# Patient Record
Sex: FEMALE | Race: WHITE | NOT HISPANIC OR LATINO | URBAN - METROPOLITAN AREA
[De-identification: names, ages, dates, MRNs, and addresses within clinical notes are randomized per-mention and may not be internally consistent; named-entity substitution may affect disease eponyms.]

---

## 2022-11-09 PROBLEM — O26.899 RH NEGATIVE STATE IN ANTEPARTUM PERIOD: Status: ACTIVE | Noted: 2022-11-09

## 2022-11-09 PROBLEM — Z67.91 RH NEGATIVE STATE IN ANTEPARTUM PERIOD: Status: ACTIVE | Noted: 2022-11-09

## 2022-12-19 PROBLEM — Z34.92 NORMAL PREGNANCY IN SECOND TRIMESTER: Status: ACTIVE | Noted: 2022-12-19

## 2022-12-19 PROBLEM — F32.A DEPRESSION AFFECTING PREGNANCY: Status: ACTIVE | Noted: 2022-12-19

## 2022-12-19 PROBLEM — O99.340 DEPRESSION AFFECTING PREGNANCY: Status: ACTIVE | Noted: 2022-12-19

## 2022-12-29 PROBLEM — R87.610 ATYPICAL SQUAMOUS CELL CHANGES OF UNDETERMINED SIGNIFICANCE (ASCUS) ON CERVICAL CYTOLOGY WITH POSITIVE HIGH RISK HUMAN PAPILLOMA VIRUS (HPV): Status: ACTIVE | Noted: 2022-12-29

## 2022-12-29 PROBLEM — R87.810 ATYPICAL SQUAMOUS CELL CHANGES OF UNDETERMINED SIGNIFICANCE (ASCUS) ON CERVICAL CYTOLOGY WITH POSITIVE HIGH RISK HUMAN PAPILLOMA VIRUS (HPV): Status: ACTIVE | Noted: 2022-12-29

## 2023-02-09 PROBLEM — Z86.19 HISTORY OF COLD SORES: Status: ACTIVE | Noted: 2023-02-09

## 2023-02-21 ENCOUNTER — PATIENT MESSAGE (OUTPATIENT)
Dept: ADMINISTRATIVE | Facility: OTHER | Age: 23
End: 2023-02-21

## 2023-03-02 PROBLEM — O26.842 UTERINE SIZE-DATE DISCREPANCY IN SECOND TRIMESTER: Status: ACTIVE | Noted: 2023-03-02

## 2023-03-14 ENCOUNTER — PATIENT MESSAGE (OUTPATIENT)
Dept: ADMINISTRATIVE | Facility: OTHER | Age: 23
End: 2023-03-14

## 2023-03-15 PROBLEM — Z34.03 SUPERVISION OF NORMAL FIRST PREGNANCY IN THIRD TRIMESTER: Status: ACTIVE | Noted: 2022-12-19

## 2023-03-15 PROBLEM — J06.9 VIRAL URI WITH COUGH: Status: ACTIVE | Noted: 2023-03-15

## 2023-03-27 PROBLEM — O26.842 UTERINE SIZE-DATE DISCREPANCY IN SECOND TRIMESTER: Status: RESOLVED | Noted: 2023-03-02 | Resolved: 2023-03-27

## 2023-05-04 PROBLEM — R51.9 PREGNANCY HEADACHE IN THIRD TRIMESTER: Status: ACTIVE | Noted: 2023-05-04

## 2023-05-04 PROBLEM — O36.8130 DECREASED FETAL MOVEMENT AFFECTING MANAGEMENT OF PREGNANCY IN THIRD TRIMESTER: Status: ACTIVE | Noted: 2023-05-04

## 2023-05-04 PROBLEM — O26.893 PREGNANCY HEADACHE IN THIRD TRIMESTER: Status: ACTIVE | Noted: 2023-05-04

## 2023-05-18 PROBLEM — O36.8130 DECREASED FETAL MOVEMENT AFFECTING MANAGEMENT OF PREGNANCY IN THIRD TRIMESTER: Status: RESOLVED | Noted: 2023-05-04 | Resolved: 2023-05-18

## 2023-05-22 PROBLEM — O16.3 ELEVATED BLOOD PRESSURE AFFECTING PREGNANCY IN THIRD TRIMESTER, ANTEPARTUM: Status: ACTIVE | Noted: 2023-05-22

## 2023-08-16 PROBLEM — Z34.03 SUPERVISION OF NORMAL FIRST PREGNANCY IN THIRD TRIMESTER: Status: RESOLVED | Noted: 2022-12-19 | Resolved: 2023-08-16

## 2023-08-16 PROBLEM — O16.3 ELEVATED BLOOD PRESSURE AFFECTING PREGNANCY IN THIRD TRIMESTER, ANTEPARTUM: Status: RESOLVED | Noted: 2023-05-22 | Resolved: 2023-08-16

## 2023-08-16 PROBLEM — O26.899 RH NEGATIVE STATE IN ANTEPARTUM PERIOD: Status: RESOLVED | Noted: 2022-11-09 | Resolved: 2023-08-16

## 2023-08-16 PROBLEM — J06.9 VIRAL URI WITH COUGH: Status: RESOLVED | Noted: 2023-03-15 | Resolved: 2023-08-16

## 2023-08-16 PROBLEM — Z67.91 RH NEGATIVE STATE IN ANTEPARTUM PERIOD: Status: RESOLVED | Noted: 2022-11-09 | Resolved: 2023-08-16

## 2023-08-16 PROBLEM — O36.8130 DECREASED FETUS MOVEMENTS AFFECTING MANAGEMENT OF MOTHER IN THIRD TRIMESTER: Status: RESOLVED | Noted: 2023-05-04 | Resolved: 2023-08-16

## 2023-08-16 PROBLEM — Z30.430 ENCOUNTER FOR IUD INSERTION: Status: ACTIVE | Noted: 2023-08-16

## 2023-08-29 ENCOUNTER — NURSE TRIAGE (OUTPATIENT)
Dept: ADMINISTRATIVE | Facility: CLINIC | Age: 23
End: 2023-08-29

## 2023-08-29 NOTE — TELEPHONE ENCOUNTER
Sore throat, headache, congested, hurts to talk. He daughter started coughing these passed two days as well. More difficulty to breathe with activity. Pt stated when she gets up she is not able to think straight sometimes. Pt stated she is having moderate difficulty breathing as well sob with rest. Care advice recommend pt go to ER. Pt verbalized understanding.   Reason for Disposition   MODERATE difficulty breathing (e.g., speaks in phrases, SOB even at rest, pulse 100-120)    Additional Information   Negative: SEVERE difficulty breathing (e.g., struggling for each breath, speaks in single words)   Negative: Difficult to awaken or acting confused (e.g., disoriented, slurred speech)   Negative: Bluish (or gray) lips or face now   Negative: Shock suspected (e.g., cold/pale/clammy skin, too weak to stand, low BP, rapid pulse)   Negative: Sounds like a life-threatening emergency to the triager   Negative: SEVERE or constant chest pain or pressure  (Exception: Mild central chest pain, present only when coughing.)    Protocols used: Coronavirus (COVID-19) Diagnosed or [id number]